# Patient Record
Sex: FEMALE | Race: WHITE | URBAN - METROPOLITAN AREA
[De-identification: names, ages, dates, MRNs, and addresses within clinical notes are randomized per-mention and may not be internally consistent; named-entity substitution may affect disease eponyms.]

---

## 2022-08-03 ENCOUNTER — OFFICE VISIT (OUTPATIENT)
Dept: URBAN - METROPOLITAN AREA CLINIC 91 | Facility: CLINIC | Age: 57
End: 2022-08-03
Payer: COMMERCIAL

## 2022-08-03 DIAGNOSIS — H35.371 PUCKERING OF MACULA, RIGHT EYE: ICD-10-CM

## 2022-08-03 DIAGNOSIS — H52.13 MYOPIA, BILATERAL: ICD-10-CM

## 2022-08-03 DIAGNOSIS — H25.093 OTHER AGE-RELATED INCIPIENT CATARACT, BILATERAL: ICD-10-CM

## 2022-08-03 DIAGNOSIS — H43.812 VITREOUS DEGENERATION, LEFT EYE: ICD-10-CM

## 2022-08-03 DIAGNOSIS — H35.033 HYPERTENSIVE RETINOPATHY, BILATERAL: Primary | ICD-10-CM

## 2022-08-03 DIAGNOSIS — H43.391 OTHER VITREOUS OPACITIES, RIGHT EYE: ICD-10-CM

## 2022-08-03 PROCEDURE — 99204 OFFICE O/P NEW MOD 45 MIN: CPT | Performed by: OPHTHALMOLOGY

## 2022-08-03 ASSESSMENT — INTRAOCULAR PRESSURE
OD: 16
OS: 16

## 2022-08-03 NOTE — IMPRESSION/PLAN
Impression: Other vitreous opacities, right eye: H43.623. Plan: For vitreous floaters, I have explained the potential for developing a retinal tear or detachment. Patient was advised to return ASAP if
they notice an increase or change in floaters, a change or decrease of vision or a curtain/veil coming over vision. I explained the risk of
vision loss, and possible need for retinal detachment repair in detail. Patient was also provided an informational pamphlet.

## 2022-08-03 NOTE — IMPRESSION/PLAN
Impression: Vitreous degeneration, left eye: H43.494. Plan: For posterior vitreous detachment, I have explained signs and symptoms associated with retinal tear or detachment. Patient was advised to return ASAP if they notice an increase or change in floaters, change or decrease in vision or a veil or curtain coming down over their vision. I have explained the risk of vision loss, and possible need for retinal detachment repair in detail. Patient was also provided with an informational pamphlet.

## 2022-08-03 NOTE — IMPRESSION/PLAN
Impression: Myopia, bilateral: H52.13.  Plan: Discussed that OD dominant vision is unbalanced and a possible solution may be to wear a CTL in OD or patient may continue to wear glasses when she gets symptomatic

## 2022-08-03 NOTE — IMPRESSION/PLAN
Impression: Hypertensive retinopathy, bilateral: H35.033. Plan: FFm reviewed with AV Changes OU Evidence of hypertensive retinopathy found. The patient was advised to see primary care physician to keep blood pressure under good control. Risk of sudden vision loss associated with hypertension was discussed with patient. If vision suddenly changes, patient should notify our office immediately.